# Patient Record
Sex: MALE | Race: WHITE | NOT HISPANIC OR LATINO | Employment: FULL TIME | URBAN - METROPOLITAN AREA
[De-identification: names, ages, dates, MRNs, and addresses within clinical notes are randomized per-mention and may not be internally consistent; named-entity substitution may affect disease eponyms.]

---

## 2017-01-03 ENCOUNTER — OFFICE VISIT (OUTPATIENT)
Dept: ALLERGY | Facility: CLINIC | Age: 42
End: 2017-01-03
Payer: COMMERCIAL

## 2017-01-03 VITALS
BODY MASS INDEX: 35.65 KG/M2 | HEIGHT: 71 IN | OXYGEN SATURATION: 94 % | HEART RATE: 72 BPM | WEIGHT: 254.63 LBS | DIASTOLIC BLOOD PRESSURE: 90 MMHG | SYSTOLIC BLOOD PRESSURE: 140 MMHG

## 2017-01-03 DIAGNOSIS — R05.9 COUGH: Primary | ICD-10-CM

## 2017-01-03 DIAGNOSIS — K21.9 GASTROESOPHAGEAL REFLUX DISEASE WITHOUT ESOPHAGITIS: ICD-10-CM

## 2017-01-03 DIAGNOSIS — J31.0 CHRONIC RHINITIS: ICD-10-CM

## 2017-01-03 DIAGNOSIS — I10 HYPERTENSION, ESSENTIAL: ICD-10-CM

## 2017-01-03 DIAGNOSIS — E66.01 MORBID OBESITY DUE TO EXCESS CALORIES: ICD-10-CM

## 2017-01-03 PROCEDURE — 99999 PR PBB SHADOW E&M-EST. PATIENT-LVL III: CPT | Mod: PBBFAC,,, | Performed by: ALLERGY & IMMUNOLOGY

## 2017-01-03 PROCEDURE — 1159F MED LIST DOCD IN RCRD: CPT | Mod: S$GLB,,, | Performed by: ALLERGY & IMMUNOLOGY

## 2017-01-03 PROCEDURE — 3080F DIAST BP >= 90 MM HG: CPT | Mod: S$GLB,,, | Performed by: ALLERGY & IMMUNOLOGY

## 2017-01-03 PROCEDURE — 99214 OFFICE O/P EST MOD 30 MIN: CPT | Mod: S$GLB,,, | Performed by: ALLERGY & IMMUNOLOGY

## 2017-01-03 PROCEDURE — 3077F SYST BP >= 140 MM HG: CPT | Mod: S$GLB,,, | Performed by: ALLERGY & IMMUNOLOGY

## 2017-01-03 NOTE — PROGRESS NOTES
"Sonny Winston returns to clinic today for continued evaluation of chronic rhinitis. He is here alone. He was last seen December 7, 2015.    He has wife are now living in [facility].   His wife works for [employer].  He is a  and works from there.    After his last visit, he continued to take Nexium twice a day. He says that Dr. Victor saw a "50% improvement."    Since then he has continued to take the Nexium at least once a day. It is prescribed twice a day but he forgets the afternoon dose frequently.    His rhinitis has been controlled. He is not having any conjunctivitis.  He denies any cough.    He does have some mild difficulty swallowing and throat clearing. He has not scheduled to see his gastroenterologist Dr. Cardenas but is returning to [place] in March.      He did have a spirometry done several years ago for work. He was told that there was a "mild blockage". He has not had since.    He was feeling much better after he lost weight. He has gained some back over the holidays.    He takes a leukotriene inhibitor as needed that is prescribed in the [place].    [facility] ALLERGY QUESTIONNAIRE SHORT 1/3/2017   Are you taking any new medications since your last visit? Yes   Constitution: No symptoms   Head or facial pain: No symptoms   Eyes: No symptoms   Ears: No symptoms   Nose: No symptoms   Throat: Frequent clearing, Difficulty swallowing after hoarseness   Sinuses: No symptoms   Lungs: No symptoms   Skin: No symptoms   Cardiovascular: High blood pressue   Gastrointestinal: Abdominal bloating, Heartburn/ indigestion/ reflux, Constipation   Genital/ urinary Frequency of urination, Urination at night more than twice   Musculoskeletal: Neck pain   Neurologic: No symptoms   Endocrine: No symptoms   Hematologic: Adenopathy/ enlarged lymph nodes     Physical Examination:  General: Well-developed, well-nourished, no acute distress.    Head: No sinus tenderness.  Eyes: Conjunctivae:  No " bulbar or palpebral conjunctival injection.  Ears: EAC's clear.  TM's clear.  No pre-auricular nodes.  Nose: Nasal Mucosa:  Pink.  Septum: Nasal septal deviation present. Turbinates:  No significant edema.  Polyps/Mass:  None visible.  Teeth/Gums:  No bleeding noted.  Oropharynx: No exudates.  Neck: Supple without thyromegaly. No cervical lymphadenopathy.    Respiratory/Chest: Effort: Good.  Auscultation:  Clear bilaterally.  Tender over the left mid posterior back.  Skin: Good turgor.  No urticaria or angioedema.  Neuro/Psych: Oriented x 3.    [facility] paper charts reviewed. No skin tests were done here.    Laboratory 5/14/2015:  IgE level: 41.  ImmunoCAP:  Class I: Alternaria.    Inhalant skin tests 6/2/2015:  3+ histamine control.  1-2+ [place] only.    Outside medical records and laboratory were reviewed and scanned into Epic.    Assessment:  1. Chronic rhinitis, doubt allergic, improved .  2. Cough, doubt allergic, improved.  3. Eustachian tube dysfunction, improved .  4. GERD with esophagitis.  5. LPR, improved.  6. Sleep apnea.  7. Hypertension on benazepril and atenolol.  8. Lumbar disc disease.  9. Probable musculoskeletal pain.    Recommendations:  1. Continue to observe on Nexium 40 mg  once a day.  2. Continue other medications.  3. GI follow-up on return to [place].  4. Spirometry on return to clinic.  5. Return to clinic after above.

## 2017-01-04 ENCOUNTER — PATIENT MESSAGE (OUTPATIENT)
Dept: ALLERGY | Facility: CLINIC | Age: 42
End: 2017-01-04

## 2017-01-04 DIAGNOSIS — K21.9 GASTROESOPHAGEAL REFLUX DISEASE WITHOUT ESOPHAGITIS: ICD-10-CM

## 2017-01-04 DIAGNOSIS — K21.9 GASTROESOPHAGEAL REFLUX DISEASE, ESOPHAGITIS PRESENCE NOT SPECIFIED: Primary | ICD-10-CM

## 2017-01-04 RX ORDER — ESOMEPRAZOLE MAGNESIUM 40 MG/1
40 CAPSULE, DELAYED RELEASE ORAL 2 TIMES DAILY
Qty: 60 CAPSULE | Refills: 3 | Status: SHIPPED | OUTPATIENT
Start: 2017-01-04 | End: 2017-07-08 | Stop reason: SDUPTHER

## 2017-01-05 RX ORDER — ESOMEPRAZOLE MAGNESIUM 40 MG/1
40 CAPSULE, DELAYED RELEASE ORAL
Qty: 40 CAPSULE | Refills: 2 | Status: SHIPPED | OUTPATIENT
Start: 2017-01-05 | End: 2017-08-24 | Stop reason: SDUPTHER

## 2017-02-08 ENCOUNTER — PATIENT MESSAGE (OUTPATIENT)
Dept: ALLERGY | Facility: CLINIC | Age: 42
End: 2017-02-08

## 2017-02-08 DIAGNOSIS — I10 ESSENTIAL HYPERTENSION: ICD-10-CM

## 2017-02-08 RX ORDER — BENAZEPRIL HYDROCHLORIDE 20 MG/1
TABLET ORAL
Qty: 30 TABLET | Refills: 0 | Status: SHIPPED | OUTPATIENT
Start: 2017-02-08 | End: 2017-03-20 | Stop reason: SDUPTHER

## 2017-03-20 DIAGNOSIS — I10 ESSENTIAL HYPERTENSION: ICD-10-CM

## 2017-03-20 RX ORDER — BENAZEPRIL HYDROCHLORIDE 20 MG/1
TABLET ORAL
Qty: 30 TABLET | Refills: 0 | Status: SHIPPED | OUTPATIENT
Start: 2017-03-20 | End: 2017-05-08 | Stop reason: SDUPTHER

## 2017-05-08 DIAGNOSIS — I10 ESSENTIAL HYPERTENSION: ICD-10-CM

## 2017-05-08 RX ORDER — BENAZEPRIL HYDROCHLORIDE 20 MG/1
20 TABLET ORAL DAILY
Qty: 30 TABLET | Refills: 2 | Status: SHIPPED | OUTPATIENT
Start: 2017-05-08 | End: 2017-09-29 | Stop reason: SDUPTHER

## 2017-07-08 DIAGNOSIS — K21.9 GASTROESOPHAGEAL REFLUX DISEASE, ESOPHAGITIS PRESENCE NOT SPECIFIED: ICD-10-CM

## 2017-07-10 DIAGNOSIS — K21.9 GASTROESOPHAGEAL REFLUX DISEASE WITHOUT ESOPHAGITIS: ICD-10-CM

## 2017-07-11 RX ORDER — ESOMEPRAZOLE MAGNESIUM 40 MG/1
40 CAPSULE, DELAYED RELEASE ORAL
Qty: 40 CAPSULE | Refills: 2 | OUTPATIENT
Start: 2017-07-11

## 2017-07-11 RX ORDER — ESOMEPRAZOLE MAGNESIUM 40 MG/1
CAPSULE, DELAYED RELEASE ORAL
Qty: 60 CAPSULE | Refills: 0 | Status: SHIPPED | OUTPATIENT
Start: 2017-07-11 | End: 2017-08-24 | Stop reason: SDUPTHER

## 2017-08-24 DIAGNOSIS — I10 ESSENTIAL HYPERTENSION: ICD-10-CM

## 2017-08-24 DIAGNOSIS — K21.9 GASTROESOPHAGEAL REFLUX DISEASE, ESOPHAGITIS PRESENCE NOT SPECIFIED: ICD-10-CM

## 2017-08-24 DIAGNOSIS — K21.9 GASTROESOPHAGEAL REFLUX DISEASE WITHOUT ESOPHAGITIS: ICD-10-CM

## 2017-08-25 RX ORDER — ESOMEPRAZOLE MAGNESIUM 40 MG/1
CAPSULE, DELAYED RELEASE ORAL
Qty: 60 CAPSULE | Refills: 0 | Status: SHIPPED | OUTPATIENT
Start: 2017-08-25

## 2017-08-25 RX ORDER — ESOMEPRAZOLE MAGNESIUM 40 MG/1
40 CAPSULE, DELAYED RELEASE ORAL
Qty: 40 CAPSULE | Refills: 2 | Status: SHIPPED | OUTPATIENT
Start: 2017-08-25

## 2017-08-28 RX ORDER — ATENOLOL 50 MG/1
TABLET ORAL
Qty: 30 TABLET | Refills: 5 | Status: SHIPPED | OUTPATIENT
Start: 2017-08-28

## 2017-09-29 DIAGNOSIS — I10 ESSENTIAL HYPERTENSION: ICD-10-CM

## 2017-09-29 RX ORDER — BENAZEPRIL HYDROCHLORIDE 20 MG/1
TABLET ORAL
Qty: 30 TABLET | Refills: 2 | Status: SHIPPED | OUTPATIENT
Start: 2017-09-29 | End: 2018-05-31 | Stop reason: SDUPTHER

## 2018-05-31 DIAGNOSIS — I10 ESSENTIAL HYPERTENSION: ICD-10-CM

## 2018-05-31 RX ORDER — BENAZEPRIL HYDROCHLORIDE 20 MG/1
20 TABLET ORAL DAILY
Qty: 90 TABLET | Refills: 0 | Status: SHIPPED | OUTPATIENT
Start: 2018-05-31 | End: 2019-02-04 | Stop reason: SDUPTHER

## 2018-11-26 ENCOUNTER — PATIENT MESSAGE (OUTPATIENT)
Dept: INTERNAL MEDICINE | Facility: CLINIC | Age: 43
End: 2018-11-26

## 2019-02-04 DIAGNOSIS — E78.5 HYPERLIPIDEMIA, UNSPECIFIED HYPERLIPIDEMIA TYPE: ICD-10-CM

## 2019-02-04 DIAGNOSIS — Z00.00 ANNUAL PHYSICAL EXAM: Primary | ICD-10-CM

## 2019-02-04 DIAGNOSIS — I10 ESSENTIAL HYPERTENSION: ICD-10-CM

## 2019-02-04 DIAGNOSIS — I10 HYPERTENSION, ESSENTIAL: ICD-10-CM

## 2019-02-06 RX ORDER — BENAZEPRIL HYDROCHLORIDE 20 MG/1
20 TABLET ORAL DAILY
Qty: 30 TABLET | Refills: 0 | Status: SHIPPED | OUTPATIENT
Start: 2019-02-06 | End: 2019-02-10 | Stop reason: SDUPTHER

## 2019-02-09 NOTE — TELEPHONE ENCOUNTER
Pt stated he is out of town and will make a follow up appt on [facility] when he get come back at the end of the month.    Vale MA

## 2019-02-10 RX ORDER — BENAZEPRIL HYDROCHLORIDE 20 MG/1
20 TABLET ORAL DAILY
Qty: 90 TABLET | Refills: 0 | Status: SHIPPED | OUTPATIENT
Start: 2019-02-10

## 2021-04-15 ENCOUNTER — PATIENT MESSAGE (OUTPATIENT)
Dept: RESEARCH | Facility: HOSPITAL | Age: 46
End: 2021-04-15